# Patient Record
Sex: FEMALE | Race: WHITE | NOT HISPANIC OR LATINO | URBAN - METROPOLITAN AREA
[De-identification: names, ages, dates, MRNs, and addresses within clinical notes are randomized per-mention and may not be internally consistent; named-entity substitution may affect disease eponyms.]

---

## 2017-10-09 ENCOUNTER — EMERGENCY (EMERGENCY)
Facility: HOSPITAL | Age: 72
LOS: 1 days | Discharge: LEFT BEFORE DISCHARGE | End: 2017-10-09
Attending: EMERGENCY MEDICINE
Payer: MEDICARE

## 2017-10-09 VITALS
HEART RATE: 90 BPM | WEIGHT: 119.93 LBS | HEIGHT: 61 IN | RESPIRATION RATE: 18 BRPM | SYSTOLIC BLOOD PRESSURE: 135 MMHG | OXYGEN SATURATION: 98 % | DIASTOLIC BLOOD PRESSURE: 89 MMHG | TEMPERATURE: 98 F

## 2017-10-09 LAB
ALBUMIN SERPL ELPH-MCNC: 4.8 G/DL — SIGNIFICANT CHANGE UP (ref 3.3–5.2)
ALP SERPL-CCNC: 51 U/L — SIGNIFICANT CHANGE UP (ref 40–120)
ALT FLD-CCNC: 20 U/L — SIGNIFICANT CHANGE UP
ANION GAP SERPL CALC-SCNC: 15 MMOL/L — SIGNIFICANT CHANGE UP (ref 5–17)
AST SERPL-CCNC: 31 U/L — SIGNIFICANT CHANGE UP
BASOPHILS # BLD AUTO: 0.1 K/UL — SIGNIFICANT CHANGE UP (ref 0–0.2)
BASOPHILS NFR BLD AUTO: 0.4 % — SIGNIFICANT CHANGE UP (ref 0–2)
BILIRUB SERPL-MCNC: 0.6 MG/DL — SIGNIFICANT CHANGE UP (ref 0.4–2)
BUN SERPL-MCNC: 18 MG/DL — SIGNIFICANT CHANGE UP (ref 8–20)
CALCIUM SERPL-MCNC: 10 MG/DL — SIGNIFICANT CHANGE UP (ref 8.6–10.2)
CHLORIDE SERPL-SCNC: 101 MMOL/L — SIGNIFICANT CHANGE UP (ref 98–107)
CO2 SERPL-SCNC: 27 MMOL/L — SIGNIFICANT CHANGE UP (ref 22–29)
CREAT SERPL-MCNC: 0.81 MG/DL — SIGNIFICANT CHANGE UP (ref 0.5–1.3)
EOSINOPHIL # BLD AUTO: 0.1 K/UL — SIGNIFICANT CHANGE UP (ref 0–0.5)
EOSINOPHIL NFR BLD AUTO: 0.9 % — SIGNIFICANT CHANGE UP (ref 0–6)
GLUCOSE SERPL-MCNC: 85 MG/DL — SIGNIFICANT CHANGE UP (ref 70–115)
HCT VFR BLD CALC: 44.1 % — SIGNIFICANT CHANGE UP (ref 37–47)
HGB BLD-MCNC: 14.7 G/DL — SIGNIFICANT CHANGE UP (ref 12–16)
LYMPHOCYTES # BLD AUTO: 15.3 % — LOW (ref 20–55)
LYMPHOCYTES # BLD AUTO: 2.3 K/UL — SIGNIFICANT CHANGE UP (ref 1–4.8)
MCHC RBC-ENTMCNC: 30.1 PG — SIGNIFICANT CHANGE UP (ref 27–31)
MCHC RBC-ENTMCNC: 33.3 G/DL — SIGNIFICANT CHANGE UP (ref 32–36)
MCV RBC AUTO: 90.2 FL — SIGNIFICANT CHANGE UP (ref 81–99)
MONOCYTES # BLD AUTO: 1.2 K/UL — HIGH (ref 0–0.8)
MONOCYTES NFR BLD AUTO: 8.1 % — SIGNIFICANT CHANGE UP (ref 3–10)
NEUTROPHILS # BLD AUTO: 11.3 K/UL — HIGH (ref 1.8–8)
NEUTROPHILS NFR BLD AUTO: 75.1 % — HIGH (ref 37–73)
PLATELET # BLD AUTO: 347 K/UL — SIGNIFICANT CHANGE UP (ref 150–400)
POTASSIUM SERPL-MCNC: 4.4 MMOL/L — SIGNIFICANT CHANGE UP (ref 3.5–5.3)
POTASSIUM SERPL-SCNC: 4.4 MMOL/L — SIGNIFICANT CHANGE UP (ref 3.5–5.3)
PROT SERPL-MCNC: 7.8 G/DL — SIGNIFICANT CHANGE UP (ref 6.6–8.7)
RBC # BLD: 4.89 M/UL — SIGNIFICANT CHANGE UP (ref 4.4–5.2)
RBC # FLD: 13.7 % — SIGNIFICANT CHANGE UP (ref 11–15.6)
SODIUM SERPL-SCNC: 143 MMOL/L — SIGNIFICANT CHANGE UP (ref 135–145)
TROPONIN T SERPL-MCNC: <0.01 NG/ML — SIGNIFICANT CHANGE UP (ref 0–0.06)
WBC # BLD: 15 K/UL — HIGH (ref 4.8–10.8)
WBC # FLD AUTO: 15 K/UL — HIGH (ref 4.8–10.8)

## 2017-10-09 PROCEDURE — 36415 COLL VENOUS BLD VENIPUNCTURE: CPT

## 2017-10-09 PROCEDURE — 85027 COMPLETE CBC AUTOMATED: CPT

## 2017-10-09 PROCEDURE — 99284 EMERGENCY DEPT VISIT MOD MDM: CPT | Mod: 25

## 2017-10-09 PROCEDURE — 84484 ASSAY OF TROPONIN QUANT: CPT

## 2017-10-09 PROCEDURE — 70450 CT HEAD/BRAIN W/O DYE: CPT | Mod: 26

## 2017-10-09 PROCEDURE — 80053 COMPREHEN METABOLIC PANEL: CPT

## 2017-10-09 PROCEDURE — 99284 EMERGENCY DEPT VISIT MOD MDM: CPT

## 2017-10-09 PROCEDURE — 70450 CT HEAD/BRAIN W/O DYE: CPT

## 2017-10-09 NOTE — ED ADULT TRIAGE NOTE - CHIEF COMPLAINT QUOTE
pt reports she drank a lot of strong coffee and began to then feel hot flashes and palpitations. states she felt like her face was swelling up. no noted facial droop or neurological deficits. ambulatory into ED with EMS. TOÑO with strength and purpose, no SOB/chest pain.

## 2017-10-09 NOTE — ED PROVIDER NOTE - OBJECTIVE STATEMENT
71 y/o F pt with hx of asthma, HLD  presents to ED c/o palpitations, hot flashes, and felt swelling to cheek. She notes drinking more coffee usual. 1 81mg aspirin given.  notes pt cheeks were erythematous. recent normal stress test.  denies fever. denies HA or neck pain. no chest pain or sob. no abd pain. no n/v/d. no urinary f/u/d. no back pain. no motor or sensory deficits. slurred speech,  denies drug use. no recent travel. no rash. no other acute issues symptoms or concerns

## 2017-10-09 NOTE — ED PROVIDER NOTE - MUSCULOSKELETAL, MLM
Spine appears normal, range of motion is not limited, no muscle or joint tenderness moving all extremities

## 2017-10-09 NOTE — ED PROVIDER NOTE - NEUROLOGICAL, MLM
Alert and oriented, no focal deficits, no motor or sensory deficits.  decrease nasal fold on the left.

## 2017-10-09 NOTE — ED ADULT NURSE NOTE - OBJECTIVE STATEMENT
Pt received A&OX3 pt states she was drinking coffe and a half glass of wine when she started to have heart palpitaions. Pt received A&OX3 pt states she was drinking coffee and a half glass of wine when she started to have heart palpitations, symptoms have resolved.  Pt states her lips are asymmetrical normally   Pt admits to drinking 2 glasses of wine a day.  HR is regular, lungs clear b/l abd soft with positive bowel sounds in all four quadrants  will cont to monitor.

## 2017-10-09 NOTE — ED ADULT NURSE REASSESSMENT NOTE - NS ED NURSE REASSESS COMMENT FT1
pt care assumed at 1930, no apparent distress noted at this time, charting as noted. Pt received Alert and Oriented to person, place, and time sitting in bed conformably with  at bedside. pt denies facial swelling or palpitations at this time. MD You at bedside for evaluation. plan of care explained, will continue to monitor.

## 2022-07-16 ENCOUNTER — APPOINTMENT (OUTPATIENT)
Dept: RADIOLOGY | Facility: CLINIC | Age: 77
End: 2022-07-16

## 2022-07-16 ENCOUNTER — OUTPATIENT (OUTPATIENT)
Dept: OUTPATIENT SERVICES | Facility: HOSPITAL | Age: 77
LOS: 1 days | End: 2022-07-16
Payer: MEDICARE

## 2022-07-16 ENCOUNTER — EMERGENCY (EMERGENCY)
Facility: HOSPITAL | Age: 77
LOS: 1 days | Discharge: HOME | End: 2022-07-16
Attending: EMERGENCY MEDICINE
Payer: MEDICARE

## 2022-07-16 VITALS
OXYGEN SATURATION: 98 % | HEART RATE: 86 BPM | HEIGHT: 61 IN | RESPIRATION RATE: 20 BRPM | TEMPERATURE: 98 F | SYSTOLIC BLOOD PRESSURE: 159 MMHG | WEIGHT: 125 LBS | DIASTOLIC BLOOD PRESSURE: 78 MMHG

## 2022-07-16 DIAGNOSIS — Z00.00 ENCOUNTER FOR GENERAL ADULT MEDICAL EXAMINATION WITHOUT ABNORMAL FINDINGS: ICD-10-CM

## 2022-07-16 PROCEDURE — 73600 X-RAY EXAM OF ANKLE: CPT | Mod: 26,LT

## 2022-07-16 PROCEDURE — 99284 EMERGENCY DEPT VISIT MOD MDM: CPT | Mod: 25

## 2022-07-16 PROCEDURE — 99282 EMERGENCY DEPT VISIT SF MDM: CPT | Mod: 25

## 2022-07-16 PROCEDURE — 29505 APPLICATION LONG LEG SPLINT: CPT | Mod: LT

## 2022-07-16 PROCEDURE — 29515 APPLICATION SHORT LEG SPLINT: CPT

## 2022-07-16 PROCEDURE — 73600 X-RAY EXAM OF ANKLE: CPT

## 2022-07-16 NOTE — ED PROVIDER NOTE - CARE PROVIDER_API CALL
Darnell Hicks)  Orthopaedic Surgery  [address] [place] [address]  Phone: [phone]  Fax: [phone]  Follow Up Time:

## 2022-07-16 NOTE — ED PROVIDER NOTE - NS ED ATTENDING STATEMENT MOD
This was a shared visit with the ROJAS. I reviewed and verified the documentation and independently performed the documented:

## 2022-07-20 ENCOUNTER — APPOINTMENT (OUTPATIENT)
Dept: ORTHOPEDIC SURGERY | Facility: CLINIC | Age: 77
End: 2022-07-20

## 2022-07-20 VITALS
SYSTOLIC BLOOD PRESSURE: 141 MMHG | HEART RATE: 102 BPM | BODY MASS INDEX: 25.19 KG/M2 | WEIGHT: 120 LBS | HEIGHT: 58 IN | DIASTOLIC BLOOD PRESSURE: 94 MMHG

## 2022-07-20 DIAGNOSIS — S82.892A OTHER FRACTURE OF LEFT LOWER LEG, INITIAL ENCOUNTER FOR CLOSED FRACTURE: ICD-10-CM

## 2022-07-20 PROCEDURE — 27786 TREATMENT OF ANKLE FRACTURE: CPT | Mod: LT

## 2022-07-20 PROCEDURE — 99203 OFFICE O/P NEW LOW 30 MIN: CPT | Mod: 57

## 2022-07-20 NOTE — DISCUSSION/SUMMARY
[de-identified] : The patient presents today with clinical exam findings and radiographic imaging consistent with a minimally displaced distal fibula fracture. Based on the fracture pattern and the ankle stability, it does not require surgical intervention. The ankle mortise appears quite stable and as such should be able to be treated nonoperatively. We will allow the patient to weight-bear as tolerated in a cam walker boot. The patient should come back in one month if she is still having pain for repeat x-rays to evaluate for bony healing. At that time we will likely advance to an ankle brace for rotational support for one additional month.  If she is asymptomatic, she may wean out of the boot as tolerated.\par \par The patient was given the opportunity to ask questions and all questions were answered to their satisfaction.\par \par The question of when to drive is impossible to generalize to everyone because it is largely dependent on the individual.  Importantly, doctors do not have a license with the DMV to "clear you" or "release you" to return to driving.  There are 3 primary criteria that must be met.  You need to be off of narcotic pain medicines (otherwise you are driving under the influence).  You need to be able to get in and out of the 's seat comfortably.  And you must have regained your normal reflexes / strength.  Also, return to driving depends partly on what side had surgery (ie. Right leg operates the pedals; people with Left side surgery can generally get back to driving much sooner unless you have a clutch).  The average time to return to driving is around 2 weeks after you return to normal walking for the right side and usually sooner for the left.  We recommend 'testing' yourself with another licensed  in an empty parking lot or quiet street first in order to check your reflexes moving your foot from pedal to pedal.\par \par Darnell Hicks MD\par Orthopaedic Trauma Surgeon\par [facility]\par [facility] [place]\par Director Orthopaedic Trauma, [facility]\par \par \par \par

## 2022-07-20 NOTE — PHYSICAL EXAM
[de-identified] : Physical Exam:\par General: Well appearing, no acute distress, A&O\par Neurologic: A&Ox3, No focal deficits\par Head: NCAT without abrasions, lacerations, or ecchymosis to head, face, or scalp\par Respiratory: Equal chest wall expansion bilaterally, no accessory muscle use\par Lymphatic: No lymphadenopathy palpated\par Skin: Warm and dry\par Psychiatric: Normal mood and affect\par \par LLE: SILT s/s/sp/dp/t\par Fires EHL/FHL/GS/TA\par 2+ DP/PT pulse\par brisk capillary refill \par positive tenderness to palpation over the distal fibula\par \par  [de-identified] : Plain films of the left ankle were previously obtained and reviewed today.  There is a nondisplaced transverse fracture of the distal fibula below the level of the mortise.

## 2022-07-20 NOTE — HISTORY OF PRESENT ILLNESS
[de-identified] : Patient is a pleasant 77-year-old female who presents today for evaluation of left ankle pain.  She [place] when she believes she must of twisted her ankle because she ended up on the ground.  She had difficulty weightbearing.  She was seen in urgent care where x-rays were obtained and a distal fibula fracture was diagnosed.  The patient states the pain is made worse with activity and relieved with rest.  Aching, 4 out of 10.  She got the splint wet in the shower today.  She was in such a hurry to get out that she forgot her other shoe. [Bending] : worsened by bending [Lifting] : worsened by lifting [Recumbency] : relieved by recumbency [Rest] : relieved by rest

## 2022-11-24 ENCOUNTER — APPOINTMENT (OUTPATIENT)
Dept: AFTER HOURS CARE | Facility: EMERGENCY ROOM | Age: 77
End: 2022-11-24

## 2022-11-24 DIAGNOSIS — B34.9 VIRAL INFECTION, UNSPECIFIED: ICD-10-CM

## 2022-11-24 PROCEDURE — 99203 OFFICE O/P NEW LOW 30 MIN: CPT | Mod: 95

## 2022-11-25 PROBLEM — B34.9 VIRAL SYNDROME: Status: ACTIVE | Noted: 2022-11-25

## 2022-11-25 NOTE — ASSESSMENT
[FreeTextEntry1] : Flu like symptoms without chest pain or shortness of breath but with severe headache and persistent nausea, unable to tolerate PO

## 2022-11-25 NOTE — HISTORY OF PRESENT ILLNESS
[Home] : at home, [unfilled] , at the time of the visit. [Other Location: e.g. Home (Enter Location, City,State)___] : at [unfilled] [Verbal consent obtained from patient] : the patient, [unfilled] [FreeTextEntry8] : 78 yo female with PMH asthma for evaluation of headache , cough, sore throat, nausea- throbbing, nausea, \par first time feeling this severe flu like symptoms. Onset earlier x few days ago, progressively worsening headache. Patient reports that headache is associated with severe nausea, unable to tolerate PO. Patient called PCP who ordered Zofran and reports she tried taking ondansetron with no improvement. States she is trying to hydrate but difficult with the nausea.\par boostered for COVID  2 days ago\par +sick contact with covid\par Denies myalgia, denies chest pain or shortness of breath\par allergies: nkda\par never smoker\par

## 2022-11-25 NOTE — PLAN
[No new medications perscribed] : Treat in place: No new medications prescribed [By Private Vehicle] : Sent to Emergency Department by private vehicle [FreeTextEntry1] : Go to emergency room for in person evaluation and ongoing  treatment - patient reports she has someone to take her to the emergency room right away